# Patient Record
Sex: MALE | Race: WHITE | Employment: FULL TIME | URBAN - METROPOLITAN AREA
[De-identification: names, ages, dates, MRNs, and addresses within clinical notes are randomized per-mention and may not be internally consistent; named-entity substitution may affect disease eponyms.]

---

## 2019-10-08 PROBLEM — M54.16 LUMBAR RADICULOPATHY: Status: ACTIVE | Noted: 2019-10-08

## 2021-12-13 PROCEDURE — 88305 TISSUE EXAM BY PATHOLOGIST: CPT

## 2021-12-14 ENCOUNTER — HOSPITAL ENCOUNTER (OUTPATIENT)
Dept: LAB | Age: 66
Discharge: HOME | End: 2021-12-14

## 2022-03-18 PROBLEM — M54.16 LUMBAR RADICULOPATHY: Status: ACTIVE | Noted: 2019-10-08

## 2024-11-22 ENCOUNTER — HOSPITAL ENCOUNTER (OUTPATIENT)
Dept: GENERAL RADIOLOGY | Age: 69
Discharge: HOME | End: 2024-11-22
Payer: MEDICARE

## 2024-11-22 ENCOUNTER — OFFICE VISIT (OUTPATIENT)
Dept: ORTHOPEDIC SURGERY | Age: 69
End: 2024-11-22

## 2024-11-22 DIAGNOSIS — M25.512 LEFT SHOULDER PAIN, UNSPECIFIED CHRONICITY: ICD-10-CM

## 2024-11-22 DIAGNOSIS — M67.912 TENDINOPATHY OF LEFT ROTATOR CUFF: ICD-10-CM

## 2024-11-22 DIAGNOSIS — M25.512 LEFT SHOULDER PAIN, UNSPECIFIED CHRONICITY: Primary | ICD-10-CM

## 2024-11-22 PROCEDURE — 73030 X-RAY EXAM OF SHOULDER: CPT

## 2024-11-22 RX ORDER — METHYLPREDNISOLONE ACETATE 40 MG/ML
40 INJECTION, SUSPENSION INTRA-ARTICULAR; INTRALESIONAL; INTRAMUSCULAR; SOFT TISSUE ONCE
Status: COMPLETED | OUTPATIENT
Start: 2024-11-22 | End: 2024-11-22

## 2024-11-22 RX ADMIN — METHYLPREDNISOLONE ACETATE 40 MG: 40 INJECTION, SUSPENSION INTRA-ARTICULAR; INTRALESIONAL; INTRAMUSCULAR; SOFT TISSUE at 11:05

## 2024-11-22 NOTE — PROGRESS NOTES
Name: Dre Royal  YOB: 1955  Gender: male  MRN: [id number]  Date of Encounter:  11/22/2024       CHIEF COMPLAINT:     Chief Complaint   Patient presents with    Shoulder Pain     Left        SUBJECTIVE/OBJECTIVE:      HPI:    Dre Royal  is a 68 y.o. pleasant male with a hx of lumbar DDD who presents today for a new evaluation of his left shoulder pain.    He reports 6 months or so of atraumatic shoulder pain. He thinks it started in doing a lot of yard work. He has pain to the lateral shoulder and upper arm that is typically more notable with abduction over 90 degrees or posterior reach.  Shoulder pain is generally tolerable with sleep.  He takes Advil on occasion.  No paresthesias reported.  He is right-hand dominant.     PAST HISTORY:   Past medical, surgical, family, social history and allergies reviewed by me.   Tobacco use:  reports that he has never smoked. He has never used smokeless tobacco.  Diabetes: none  No results found for: \"LABA1C\"      REVIEW OF SYSTEMS:   As noted in HPI.     PHYSICAL EXAMINATION:     Gen: Well-developed, no acute distress   HEENT: NC/AT, EOMI   Neck: Trachea midline, normal ROM   CV: Regular rhythm by palpation of distal pulse, normal capillary refill   Pulm: No respiratory distress, no stridor   Psychiatric: Well oriented, normal mood and affect.   Skin: No rashes, lesions or ulcers, normal temperature, turgor, and texture on uninvolved extremity.      ORTHO EXAM:    Left Shoulder:     Inspection: no biceps deformity; no notable atrophy or erythema  ROM active  passive:   180. Abduction 170  170.  Ex rotation symmetric. Int rotation: lumbar spine.  Tenderness: No tenderness  Strength: Abduction 4+/5, External rotation 5/5, Internal rotation 5/5  Provocative tests: Negative Belly press, bearhug, Speeds, Cross body adduction, positive Mayorga / Neer, positive empty can   Normal capillary refill / 2+ radial pulse   Sensation intact to light 
no

## 2025-01-20 ENCOUNTER — HOSPITAL ENCOUNTER (OUTPATIENT)
Dept: PHYSICAL THERAPY | Age: 70
Setting detail: RECURRING SERIES
Discharge: HOME | End: 2025-01-23
Payer: MEDICARE

## 2025-01-20 DIAGNOSIS — M25.512 CHRONIC LEFT SHOULDER PAIN: ICD-10-CM

## 2025-01-20 DIAGNOSIS — M67.912 TENDINOPATHY OF LEFT ROTATOR CUFF: Primary | ICD-10-CM

## 2025-01-20 DIAGNOSIS — G89.29 CHRONIC LEFT SHOULDER PAIN: ICD-10-CM

## 2025-01-20 PROCEDURE — 97110 THERAPEUTIC EXERCISES: CPT

## 2025-01-20 PROCEDURE — 97161 PT EVAL LOW COMPLEX 20 MIN: CPT

## 2025-01-20 ASSESSMENT — PAIN SCALES - GENERAL: PAINLEVEL_OUTOF10: 0

## 2025-01-20 NOTE — THERAPY EVALUATION
able to help with push a cart for yard work [] [] []   4.Dre Royal will decrease Quick DASH score by 10 points to show improvement in areas of difficulty [] [] []     Goal Summary: To be determined at discharge.           Outcome Measure:   Tool Used: Disabilities of the Arm, Shoulder and Hand (DASH) Questionnaire - Quick Version  Score:  Initial: 27/55  Most Recent: X/55 (Date: -- )   Interpretation of Score: The DASH is designed to measure the activities of daily living in person's with upper extremity dysfunction or pain.  Each section is scored on a 1-5 scale, 5 representing the greatest disability.  The scores of each section are added together for a total score of 55.      Medical Necessity:   > Patient is expected to demonstrate progress in strength, range of motion, and functional technique to return to normal activity without pain.  Reason For Services/Other Comments:  > Patient continues to require modification of therapeutic interventions to increase complexity of exercises.      Regarding Dre Royal's therapy, I certify that the treatment plan above will be carried out by a therapist or under their direction.  Thank you for this referral,  Shiva Garcia PT     Referring Physician Signature: Kaelyn Venegas MD                    Charge Capture  Appt Desk

## 2025-01-20 NOTE — PROGRESS NOTES
Dre Royal  : 1955  Primary: Medicare Part A And B (Medicare)  Secondary: [employer] SENIOR MEDICARE SUPP [facility] @ [place] [address] [address] [place] [place] [address]  Phone: [phone]  Fax: [phone] Plan Frequency: 2 x a week for 6-8  Plan of Care/Certification Expiration Date: 25        Plan of Care/Certification Expiration Date:  Plan of Care/Certification Expiration Date: 25    Frequency/Duration: Plan Frequency: 2 x a week for 6-8      Time In/Out:   Time In: 1015  Time Out: 1115    PT Visit Info:    Plan Frequency: 2 x a week for 6-8      Visit Count:  1    OUTPATIENT PHYSICAL THERAPY:   Treatment Note 2025       Charge Capture   Episode  (Tendinopathy of left rotator cuff)               Treatment Diagnosis:    Tendinopathy of left rotator cuff  Chronic left shoulder pain  Medical/Referring Diagnosis:    Unspecified disorder of synovium and tendon, left shoulder [M67.912]    Referring Physician:  Kaelyn Venegas MD MD Orders:  PT Eval and Treat   Return MD Appt:  TBD   Date of Onset:  Onset Date:  (2024)     Allergies:   Patient has no allergy information on record.  Restrictions/Precautions:   None      Interventions Planned (Treatment may consist of any combination of the following):     See Assessment Note      Subjective Comments:     See Eval  Initial Pain Level::     0/10  Post Session Pain Level:       0/10  Medications Last Reviewed:  2025  Updated Objective Findings:  See Evaluation Note from today  Treatment   THERAPEUTIC EXERCISE: (23 minutes):    Exercises per grid below to improve mobility, strength, balance, coordination, and dynamic movement of generalized knee to improve functional bending, lifting, and carrying.  Required minimal visual, verbal, and manual cues to promote proper body alignment, promote proper body posture, and promote proper body mechanics.  Progressed resistance, range, repetitions, and complexity of

## 2025-01-22 ENCOUNTER — HOSPITAL ENCOUNTER (OUTPATIENT)
Dept: PHYSICAL THERAPY | Age: 70
Setting detail: RECURRING SERIES
Discharge: HOME | End: 2025-01-25
Payer: MEDICARE

## 2025-01-22 PROCEDURE — 97110 THERAPEUTIC EXERCISES: CPT

## 2025-01-22 NOTE — PROGRESS NOTES
Dre Royal  : 1955  Primary: Medicare Part A And B (Medicare)  Secondary: [employer] SENIOR MEDICARE SUPP [facility] @ [place] [address] [address] [place] [place] [address]  Phone: [phone]  Fax: [phone] Plan Frequency: 2 x a week for 6-8  Plan of Care/Certification Expiration Date: 25        Plan of Care/Certification Expiration Date:  Plan of Care/Certification Expiration Date: 25    Frequency/Duration: Plan Frequency: 2 x a week for 6-8      Time In/Out:   Time In: 0930  Time Out: 1015    PT Visit Info:    Plan Frequency: 2 x a week for 6-8      Visit Count:  2    OUTPATIENT PHYSICAL THERAPY:   Treatment Note 2025       Charge Capture   Episode  (Tendinopathy of left rotator cuff)               Treatment Diagnosis:    Tendinopathy of left rotator cuff  Chronic left shoulder pain  Medical/Referring Diagnosis:    Unspecified disorder of synovium and tendon, left shoulder [M67.912]    Referring Physician:  Kaelyn Venegas MD MD Orders:  PT Eval and Treat   Return MD Appt:  TBD   Date of Onset:  Onset Date:  (2024)     Allergies:   Patient has no allergy information on record.  Restrictions/Precautions:   None      Interventions Planned (Treatment may consist of any combination of the following):     See Assessment Note      Subjective Comments:     Patient reports soreness with wand ER  Initial Pain Level::   sore   /10  Post Session Pain Level:    sore    /10  Medications Last Reviewed:  2025  Updated Objective Findings:  None Today  Treatment   THERAPEUTIC EXERCISE: (38 minutes):    Exercises per grid below to improve mobility, strength, balance, coordination, and dynamic movement of generalized knee to improve functional bending, lifting, and carrying.  Required minimal visual, verbal, and manual cues to promote proper body alignment, promote proper body posture, and promote proper body mechanics.  Progressed resistance, range, repetitions,

## 2025-01-27 ENCOUNTER — APPOINTMENT (OUTPATIENT)
Dept: PHYSICAL THERAPY | Age: 70
End: 2025-01-27
Payer: MEDICARE

## 2025-01-29 ENCOUNTER — HOSPITAL ENCOUNTER (OUTPATIENT)
Dept: PHYSICAL THERAPY | Age: 70
Setting detail: RECURRING SERIES
Discharge: HOME | End: 2025-02-01
Payer: MEDICARE

## 2025-01-29 PROCEDURE — 97110 THERAPEUTIC EXERCISES: CPT

## 2025-01-29 NOTE — PROGRESS NOTES
Dre Royal  : 1955  Primary: Medicare Part A And B (Medicare)  Secondary: [employer] SENIOR MEDICARE SUPP [facility] @ [place] [address] [address] [place] [place] [address]  Phone: [phone]  Fax: [phone] Plan Frequency: 2 x a week for 6-8  Plan of Care/Certification Expiration Date: 25        Plan of Care/Certification Expiration Date:  Plan of Care/Certification Expiration Date: 25    Frequency/Duration: Plan Frequency: 2 x a week for 6-8      Time In/Out:   Time In: 0900  Time Out: 0945    PT Visit Info:    Plan Frequency: 2 x a week for 6-8      Visit Count:  3    OUTPATIENT PHYSICAL THERAPY:   Treatment Note 2025       Charge Capture   Episode  (Tendinopathy of left rotator cuff)               Treatment Diagnosis:    Tendinopathy of left rotator cuff  Chronic left shoulder pain  Medical/Referring Diagnosis:    Unspecified disorder of synovium and tendon, left shoulder [M67.912]    Referring Physician:  Kaelyn Venegas MD MD Orders:  PT Eval and Treat   Return MD Appt:  TBD   Date of Onset:  Onset Date:  (2024)     Allergies:   Patient has no allergy information on record.  Restrictions/Precautions:   None      Interventions Planned (Treatment may consist of any combination of the following):     See Assessment Note      Subjective Comments:     Patient reports being sick over the weekend  Initial Pain Level::   sore   /10  Post Session Pain Level:    sore    /10  Medications Last Reviewed:  2025  Updated Objective Findings:  None Today  Treatment   THERAPEUTIC EXERCISE: (40 minutes):    Exercises per grid below to improve mobility, strength, balance, coordination, and dynamic movement of generalized knee to improve functional bending, lifting, and carrying.  Required minimal visual, verbal, and manual cues to promote proper body alignment, promote proper body posture, and promote proper body mechanics.  Progressed resistance, range,

## 2025-02-03 ENCOUNTER — HOSPITAL ENCOUNTER (OUTPATIENT)
Dept: PHYSICAL THERAPY | Age: 70
Setting detail: RECURRING SERIES
Discharge: HOME | End: 2025-02-06
Payer: MEDICARE

## 2025-02-03 PROCEDURE — 97110 THERAPEUTIC EXERCISES: CPT

## 2025-02-03 NOTE — PROGRESS NOTES
Dre Royal  : 1955  Primary: Medicare Part A And B (Medicare)  Secondary: [employer] SENIOR MEDICARE SUPP [facility] @ [place] [address] [address] [place] [place] [address]  Phone: [phone]  Fax: [phone] Plan Frequency: 2 x a week for 6-8  Plan of Care/Certification Expiration Date: 25        Plan of Care/Certification Expiration Date:  Plan of Care/Certification Expiration Date: 25    Frequency/Duration: Plan Frequency: 2 x a week for 6-8      Time In/Out:   Time In: 1035  Time Out: 1120    PT Visit Info:    Plan Frequency: 2 x a week for 6-8      Visit Count:  4    OUTPATIENT PHYSICAL THERAPY:   Treatment Note 2/3/2025       Charge Capture   Episode  (Tendinopathy of left rotator cuff)               Treatment Diagnosis:    Tendinopathy of left rotator cuff  Chronic left shoulder pain  Medical/Referring Diagnosis:    Unspecified disorder of synovium and tendon, left shoulder [M67.912]    Referring Physician:  Kaelyn Venegas MD MD Orders:  PT Eval and Treat   Return MD Appt:  TBD   Date of Onset:  Onset Date:  (2024)     Allergies:   Patient has no allergy information on record.  Restrictions/Precautions:   None      Interventions Planned (Treatment may consist of any combination of the following):     See Assessment Note      Subjective Comments:     Patient reports some improvement with ROM, still having soreness with reaching and extending.  Initial Pain Level::   sore   /10  Post Session Pain Level:    sore    /10  Medications Last Reviewed:  2/3/2025  Updated Objective Findings:  None Today  Treatment   THERAPEUTIC EXERCISE: (40 minutes):    Exercises per grid below to improve mobility, strength, balance, coordination, and dynamic movement of generalized knee to improve functional bending, lifting, and carrying.  Required minimal visual, verbal, and manual cues to promote proper body alignment, promote proper body posture, and promote proper body

## 2025-02-05 ENCOUNTER — HOSPITAL ENCOUNTER (OUTPATIENT)
Dept: PHYSICAL THERAPY | Age: 70
Setting detail: RECURRING SERIES
Discharge: HOME | End: 2025-02-08
Payer: MEDICARE

## 2025-02-05 PROCEDURE — 97110 THERAPEUTIC EXERCISES: CPT

## 2025-02-05 NOTE — PROGRESS NOTES
Dre Royal  : 1955  Primary: Medicare Part A And B (Medicare)  Secondary: [employer] SENIOR MEDICARE SUPP [facility] @ [place] [address] [address] [place] [place] [address]  Phone: [phone]  Fax: [phone] Plan Frequency: 2 x a week for 6-8  Plan of Care/Certification Expiration Date: 25        Plan of Care/Certification Expiration Date:  Plan of Care/Certification Expiration Date: 25    Frequency/Duration: Plan Frequency: 2 x a week for 6-8      Time In/Out:   Time In: 1030  Time Out: 1115    PT Visit Info:    Plan Frequency: 2 x a week for 6-8      Visit Count:  5    OUTPATIENT PHYSICAL THERAPY:   Treatment Note 2025       Charge Capture   Episode  (Tendinopathy of left rotator cuff)               Treatment Diagnosis:    Tendinopathy of left rotator cuff  Chronic left shoulder pain  Medical/Referring Diagnosis:    Unspecified disorder of synovium and tendon, left shoulder [M67.912]    Referring Physician:  Kaelyn Venegas MD MD Orders:  PT Eval and Treat   Return MD Appt:  TBD   Date of Onset:  Onset Date:  (2024)     Allergies:   Patient has no allergy information on record.  Restrictions/Precautions:   None      Interventions Planned (Treatment may consist of any combination of the following):     See Assessment Note      Subjective Comments:     Patient reports some soreness yesterday more on top of shoulder  Initial Pain Level::   sore   /10  Post Session Pain Level:    sore    /10  Medications Last Reviewed:  2025  Updated Objective Findings:  None Today  Treatment   THERAPEUTIC EXERCISE: (40 minutes):    Exercises per grid below to improve mobility, strength, balance, coordination, and dynamic movement of generalized knee to improve functional bending, lifting, and carrying.  Required minimal visual, verbal, and manual cues to promote proper body alignment, promote proper body posture, and promote proper body mechanics.  Progressed

## 2025-02-10 ENCOUNTER — HOSPITAL ENCOUNTER (OUTPATIENT)
Dept: PHYSICAL THERAPY | Age: 70
Setting detail: RECURRING SERIES
Discharge: HOME | End: 2025-02-13
Payer: MEDICARE

## 2025-02-10 PROCEDURE — 97110 THERAPEUTIC EXERCISES: CPT

## 2025-02-12 ENCOUNTER — HOSPITAL ENCOUNTER (OUTPATIENT)
Dept: PHYSICAL THERAPY | Age: 70
Setting detail: RECURRING SERIES
Discharge: HOME | End: 2025-02-15
Payer: MEDICARE

## 2025-02-12 PROCEDURE — 97110 THERAPEUTIC EXERCISES: CPT

## 2025-02-12 NOTE — PROGRESS NOTES
Dre Royal  : 1955  Primary: Medicare Part A And B (Medicare)  Secondary: [employer] SENIOR MEDICARE SUPP [facility] @ [place] [address] [address] [place] [place] [address]  Phone: [phone]  Fax: [phone] Plan Frequency: 2 x a week for 6-8  Plan of Care/Certification Expiration Date: 25        Plan of Care/Certification Expiration Date:  Plan of Care/Certification Expiration Date: 25    Frequency/Duration: Plan Frequency: 2 x a week for 6-8      Time In/Out:   Time In: 1030  Time Out: 1115    PT Visit Info:    Plan Frequency: 2 x a week for 6-8      Visit Count:  7    OUTPATIENT PHYSICAL THERAPY:   Treatment Note 2025       Charge Capture   Episode  (Tendinopathy of left rotator cuff)               Treatment Diagnosis:    Tendinopathy of left rotator cuff  Chronic left shoulder pain  Medical/Referring Diagnosis:    Unspecified disorder of synovium and tendon, left shoulder [M67.912]    Referring Physician:  Kaelyn Venegas MD MD Orders:  PT Eval and Treat   Return MD Appt:  TBD   Date of Onset:  Onset Date:  (2024)     Allergies:   Patient has no allergy information on record.  Restrictions/Precautions:   None      Interventions Planned (Treatment may consist of any combination of the following):     See Assessment Note      Subjective Comments:     Patient reports doing ok, some soreness after stretching  Initial Pain Level::   sore   /10  Post Session Pain Level:    sore    /10  Medications Last Reviewed:  2025  Updated Objective Findings:  None Today  Treatment   THERAPEUTIC EXERCISE: (40 minutes):    Exercises per grid below to improve mobility, strength, balance, coordination, and dynamic movement of generalized knee to improve functional bending, lifting, and carrying.  Required minimal visual, verbal, and manual cues to promote proper body alignment, promote proper body posture, and promote proper body mechanics.  Progressed resistance,

## 2025-02-17 ENCOUNTER — APPOINTMENT (OUTPATIENT)
Dept: PHYSICAL THERAPY | Age: 70
End: 2025-02-17
Payer: MEDICARE

## 2025-02-19 ENCOUNTER — HOSPITAL ENCOUNTER (OUTPATIENT)
Dept: PHYSICAL THERAPY | Age: 70
Setting detail: RECURRING SERIES
Discharge: HOME | End: 2025-02-22
Payer: MEDICARE

## 2025-02-19 PROCEDURE — 97110 THERAPEUTIC EXERCISES: CPT

## 2025-02-19 NOTE — PROGRESS NOTES
Dre Royal  : 1955  Primary: Medicare Part A And B (Medicare)  Secondary: AETNA SENIOR MEDICARE SUPP [facility] @ [place] [address] [place] [address]  Phone: [phone]  Fax: [phone] Plan Frequency: 2 x a week for 6-8  Plan of Care/Certification Expiration Date: 25        Plan of Care/Certification Expiration Date:  Plan of Care/Certification Expiration Date: 25    Frequency/Duration: Plan Frequency: 2 x a week for 6-8      Time In/Out:   Time In: 1030  Time Out: 1115    PT Visit Info:    Plan Frequency: 2 x a week for 6-8      Visit Count:  8    OUTPATIENT PHYSICAL THERAPY:   Treatment Note 2025       Charge Capture   Episode  (Tendinopathy of left rotator cuff)               Treatment Diagnosis:    Tendinopathy of left rotator cuff  Chronic left shoulder pain  Medical/Referring Diagnosis:    Unspecified disorder of synovium and tendon, left shoulder [M67.912]    Referring Physician:  Kaelyn Venegas MD MD Orders:  PT Eval and Treat   Return MD Appt:  TBD   Date of Onset:  Onset Date:  (2024)     Allergies:   Patient has no allergy information on record.  Restrictions/Precautions:   None      Interventions Planned (Treatment may consist of any combination of the following):     See Assessment Note      Subjective Comments:     Patient reports being more sore after [place] and has been waking up when lying on left side  Initial Pain Level::   sore   /10  Post Session Pain Level:    sore    /10  Medications Last Reviewed:  2025  Updated Objective Findings:  None Today  Treatment   THERAPEUTIC EXERCISE: (40 minutes):    Exercises per grid below to improve mobility, strength, balance, coordination, and dynamic movement of generalized knee to improve functional bending, lifting, and carrying.  Required minimal visual, verbal, and manual cues to promote proper body alignment, promote proper

## 2025-02-24 ENCOUNTER — HOSPITAL ENCOUNTER (OUTPATIENT)
Dept: PHYSICAL THERAPY | Age: 70
Setting detail: RECURRING SERIES
Discharge: HOME | End: 2025-02-27
Payer: MEDICARE

## 2025-02-24 PROCEDURE — 97110 THERAPEUTIC EXERCISES: CPT

## 2025-02-24 NOTE — PROGRESS NOTES
rDe Royal  : 1955  Primary: Medicare Part A And B (Medicare)  Secondary: A[employer] SENIOR MEDICARE SUPP [facility] @ [place] [address] [address] [place] [address]  Phone: [phone]  Fax: [phone] Plan Frequency: 2 x a week for 6-8  Plan of Care/Certification Expiration Date: 25        Plan of Care/Certification Expiration Date:  Plan of Care/Certification Expiration Date: 25    Frequency/Duration: Plan Frequency: 2 x a week for 6-8      Time In/Out:   Time In: 1030  Time Out: 1115    PT Visit Info:    Plan Frequency: 2 x a week for 6-8      Visit Count:  9    OUTPATIENT PHYSICAL THERAPY:   Treatment Note 2025       Charge Capture   Episode  (Tendinopathy of left rotator cuff)               Treatment Diagnosis:    Tendinopathy of left rotator cuff  Chronic left shoulder pain  Medical/Referring Diagnosis:    Unspecified disorder of synovium and tendon, left shoulder [M67.912]    Referring Physician:  Kaelyn Venegas MD MD Orders:  PT Eval and Treat   Return MD Appt:  TBD   Date of Onset:  Onset Date:  (2024)     Allergies:   Patient has no allergy information on record.  Restrictions/Precautions:   None      Interventions Planned (Treatment may consist of any combination of the following):     See Assessment Note      Subjective Comments:     Patient reports he was doing well and tripped over cat in the house and hit his shoulder on the door frame.  Initial Pain Level::   sore   /10  Post Session Pain Level:    sore    /10  Medications Last Reviewed:  2025  Updated Objective Findings:  None Today  Treatment   THERAPEUTIC EXERCISE: (40 minutes):    Exercises per grid below to improve mobility, strength, balance, coordination, and dynamic movement of generalized knee to improve functional bending, lifting, and carrying.  Required minimal visual, verbal, and manual cues to promote proper body alignment, promote proper body posture, and

## 2025-02-26 ENCOUNTER — HOSPITAL ENCOUNTER (OUTPATIENT)
Dept: PHYSICAL THERAPY | Age: 70
Setting detail: RECURRING SERIES
Discharge: HOME | End: 2025-03-01
Payer: MEDICARE

## 2025-02-26 PROCEDURE — 97110 THERAPEUTIC EXERCISES: CPT

## 2025-02-26 NOTE — PROGRESS NOTES
Dre Royal  : 1955  Primary: Medicare Part A And B (Medicare)  Secondary: AETNA SENIOR MEDICARE SUPP [facility] @ [place] [address] [address] [place] [address]  Phone: [phone]  Fax: [phone] Plan Frequency: 2 x a week for 6-8  Plan of Care/Certification Expiration Date: 25        Plan of Care/Certification Expiration Date:  Plan of Care/Certification Expiration Date: 25    Frequency/Duration: Plan Frequency: 2 x a week for 6-8      Time In/Out:   Time In: 1030  Time Out: 1115    PT Visit Info:    Plan Frequency: 2 x a week for 6-8      Visit Count:  10    OUTPATIENT PHYSICAL THERAPY:   Treatment Note 2025       Charge Capture   Episode  (Tendinopathy of left rotator cuff)               Treatment Diagnosis:    Tendinopathy of left rotator cuff  Chronic left shoulder pain  Medical/Referring Diagnosis:    Unspecified disorder of synovium and tendon, left shoulder [M67.912]    Referring Physician:  Kaelyn Venegas MD MD Orders:  PT Eval and Treat   Return MD Appt:  TBD   Date of Onset:  Onset Date:  (2024)     Allergies:   Patient has no allergy information on record.  Restrictions/Precautions:   None      Interventions Planned (Treatment may consist of any combination of the following):     See Assessment Note      Subjective Comments:     Patient reports feeling really good after last visit then tweaked his shoulder reaching into the cabinet.  Initial Pain Level::   sore   /10  Post Session Pain Level:    sore    /10  Medications Last Reviewed:  2025  Updated Objective Findings:  None Today  Treatment   THERAPEUTIC EXERCISE: (40 minutes):    Exercises per grid below to improve mobility, strength, balance, coordination, and dynamic movement of generalized knee to improve functional bending, lifting, and carrying.  Required minimal visual, verbal, and manual cues to promote proper body alignment, promote proper body posture, and

## 2025-03-04 ENCOUNTER — HOSPITAL ENCOUNTER (OUTPATIENT)
Dept: PHYSICAL THERAPY | Age: 70
Setting detail: RECURRING SERIES
Discharge: HOME | End: 2025-03-07
Payer: MEDICARE

## 2025-03-04 PROCEDURE — 97110 THERAPEUTIC EXERCISES: CPT

## 2025-03-04 NOTE — PROGRESS NOTES
resistance, range, repetitions, and complexity of movement as indicated.   Date:  2/12/2025 Date:  2/19/2025 Date:  2/24/2025 Date:  2/26/2025 Date:  3/4/2025   Activity/Exercise        Shoulder ROM  5 min 5 min 5min    UBE 3/3 L5 4/4 L5 4/4 L5 3/3 L5 3/3 L5   Rings L 98x42lod L 80v08hkj L 08m43xbj L 63s14pjf L 38f93flh   UT LS Stretch        PCS 2a12zcm 5d77kcy 5q19bim 7g62tgb L 79g76nkn           Doorway stretch L 3z84zvn L 8i77caj L 5f54vgp L 5b24ran    Wand Flexion ER        Scapular retraction        Biceps stretch 2w54sbs 3r85qbp 9q08lvf 5d16rqr 9o22olv   FE        Hang stretch    5d64rte 9k50zwj 1n62nym   Bilateral ER     TRX 3x10           Row TRX 3x10 DB L/R 2x10 20# TRX 3x10 TRX 3x10 high pull TRX 3x10 high pull   Low Row        Pull down        ER        Bicep curl     TRX 3x10   FE w/ ER        Punch with ER        TRX Fallout  2x10 2x10 2x10 2x10   Windmill TRX blue 2x10  TRX blue 2x10  TRX 2x10 Blk   90/90 ER TRX blue 2x10  TRX blue 2x10     Farmer carry 2x2 laps 2/25#       Bent over arm raise  4x5l6yeo ea 3#      WB        Press 30\" Box 2x10  TRX 3x10      Overhead press   TRX blue wand 2x10 TRX blue wand 2x10 15# bar 3x8   DB raise 3 way 2x10 5# 2 way 2x10 5#      Overhead carry 3 laps 15# plate   4 laps 15# plate    Swimmer     Prone 2x10       MANUAL THERAPY: (0 minutes):   Joint mobilization and Soft tissue mobilization was utilized and necessary because of the patient's restricted joint motion, painful spasm, loss of articular motion, and restricted motion of soft tissue.      Date  3/4/2025    Technique Used Grade  Level # Time(s) Effect while being performed                                                      MODALITIES:          Treatment/Session Summary:    Treatment Assessment:   Patient was progressed with OH strengthening without increased pain    Communication/Consultation:  None today  Equipment provided today:  HEP  Recommendations/Intent for next treatment session: Next visit will

## 2025-03-06 ENCOUNTER — HOSPITAL ENCOUNTER (OUTPATIENT)
Dept: PHYSICAL THERAPY | Age: 70
Setting detail: RECURRING SERIES
Discharge: HOME | End: 2025-03-09
Payer: MEDICARE

## 2025-03-06 PROCEDURE — 97110 THERAPEUTIC EXERCISES: CPT

## 2025-03-06 NOTE — PROGRESS NOTES
Dre Royal  : 1955  Primary: Medicare Part A And B (Medicare)  Secondary: [employer] SENIOR MEDICARE SUPP [facility] @ [place] [address] [place] [place] [address]  Phone: [phone]  Fax: [phone] Plan Frequency: 2 x a week for 6-8  Plan of Care/Certification Expiration Date: 25        Plan of Care/Certification Expiration Date:  Plan of Care/Certification Expiration Date: 25    Frequency/Duration: Plan Frequency: 2 x a week for 6-8      Time In/Out:   Time In: 1030  Time Out: 1115    PT Visit Info:    Plan Frequency: 2 x a week for 6-8      Visit Count:  12    OUTPATIENT PHYSICAL THERAPY:   Treatment Note 3/6/2025       Charge Capture   Episode  (Tendinopathy of left rotator cuff)               Treatment Diagnosis:    Tendinopathy of left rotator cuff  Chronic left shoulder pain  Medical/Referring Diagnosis:    Unspecified disorder of synovium and tendon, left shoulder [M67.912]    Referring Physician:  Kaelyn Venegas MD MD Orders:  PT Eval and Treat   Return MD Appt:  TBD   Date of Onset:  Onset Date:  (2024)     Allergies:   Patient has no allergy information on record.  Restrictions/Precautions:   None      Interventions Planned (Treatment may consist of any combination of the following):     See Assessment Note      Subjective Comments:     Patient reports if he didn't sleep on left shoulder he would feel more improvement  Initial Pain Level::   sore   /10  Post Session Pain Level:    sore    /10  Medications Last Reviewed:  3/6/2025  Updated Objective Findings:  None Today  Treatment   THERAPEUTIC EXERCISE: (40 minutes):    Exercises per grid below to improve mobility, strength, balance, coordination, and dynamic movement of generalized knee to improve functional bending, lifting, and carrying.  Required minimal visual, verbal, and manual cues to promote proper body alignment, promote proper body posture, and promote proper body mechanics.

## 2025-03-11 ENCOUNTER — APPOINTMENT (OUTPATIENT)
Dept: PHYSICAL THERAPY | Age: 70
End: 2025-03-11
Payer: MEDICARE

## 2025-03-18 ENCOUNTER — HOSPITAL ENCOUNTER (OUTPATIENT)
Dept: PHYSICAL THERAPY | Age: 70
Setting detail: RECURRING SERIES
Discharge: HOME | End: 2025-03-21
Payer: MEDICARE

## 2025-03-18 PROCEDURE — 97110 THERAPEUTIC EXERCISES: CPT

## 2025-03-18 NOTE — PROGRESS NOTES
[facility]
provided today:  HEP  Recommendations/Intent for next treatment session: Next visit will focus on progression of ROM and strength.    >Total Treatment Billable Duration:  40 minutes   Time In: 0945  Time Out: 1030    Shiva Garcia PT         Charge Capture  [employer] [facility]  Appt Desk     Future Appointments   Date Time Provider Department Center   3/25/2025 10:30 AM Shiva Garcia, PT [facility]   3/27/2025  1:00 PM Shiva Garcia, PT [facility]   4/2/2025 10:15 AM Shiva Garcia, PT [facility]   4/4/2025  8:15 AM Shiva Garcia, PT [facility]   4/8/2025  9:45 AM Shiva Garcia, PT [facility]   4/10/2025  9:45 AM Shiva aGrcia, PT [facility]

## 2025-03-20 ENCOUNTER — APPOINTMENT (OUTPATIENT)
Dept: PHYSICAL THERAPY | Age: 70
End: 2025-03-20
Payer: MEDICARE

## 2025-03-25 ENCOUNTER — HOSPITAL ENCOUNTER (OUTPATIENT)
Dept: PHYSICAL THERAPY | Age: 70
Setting detail: RECURRING SERIES
Discharge: HOME | End: 2025-03-28
Payer: MEDICARE

## 2025-03-25 PROCEDURE — 97110 THERAPEUTIC EXERCISES: CPT

## 2025-03-25 NOTE — PROGRESS NOTES
Dre Royal  : 1955  Primary: Medicare Part A And B (Medicare)  Secondary: [employer] SENIOR MEDICARE SUPP [facility] @ [place] [address] [address] [place] [place] [address]  Phone: [phone]  Fax: [phone] Plan Frequency: 2 x a week for 6-8  Plan of Care/Certification Expiration Date: 25        Plan of Care/Certification Expiration Date:  Plan of Care/Certification Expiration Date: 25    Frequency/Duration: Plan Frequency: 2 x a week for 6-8      Time In/Out:   Time In: 1030  Time Out: 1115    PT Visit Info:    Plan Frequency: 2 x a week for 6-8      Visit Count:  14    OUTPATIENT PHYSICAL THERAPY:   Treatment Note 3/25/2025       Charge Capture   Episode  (Tendinopathy of left rotator cuff)               Treatment Diagnosis:    Tendinopathy of left rotator cuff  Chronic left shoulder pain  Medical/Referring Diagnosis:    Unspecified disorder of synovium and tendon, left shoulder [M67.912]    Referring Physician:  Kaelyn Venegas MD MD Orders:  PT Eval and Treat   Return MD Appt:  TBD   Date of Onset:  Onset Date:  (2024)     Allergies:   Patient has no allergy information on record.  Restrictions/Precautions:   None      Interventions Planned (Treatment may consist of any combination of the following):     See Assessment Note      Subjective Comments:     Patient reports being able to rake the yard without increased shoulder pain  Initial Pain Level::   sore   /10  Post Session Pain Level:    sore    /10  Medications Last Reviewed:  3/25/2025  Updated Objective Findings:  None Today  Treatment   THERAPEUTIC EXERCISE: (40 minutes):    Exercises per grid below to improve mobility, strength, balance, coordination, and dynamic movement of generalized knee to improve functional bending, lifting, and carrying.  Required minimal visual, verbal, and manual cues to promote proper body alignment, promote proper body posture, and promote proper body mechanics.

## 2025-03-27 ENCOUNTER — HOSPITAL ENCOUNTER (OUTPATIENT)
Dept: PHYSICAL THERAPY | Age: 70
Setting detail: RECURRING SERIES
Discharge: HOME | End: 2025-03-30
Payer: MEDICARE

## 2025-03-27 PROCEDURE — 97110 THERAPEUTIC EXERCISES: CPT

## 2025-03-27 NOTE — PROGRESS NOTES
Dre Royal  : 1955  Primary: Medicare Part A And B (Medicare)  Secondary: [employer] SENIOR MEDICARE SUPP [facility] @ [place] [address] [address] [place] [place] [address]  Phone: [phone]  Fax: [phone] Plan Frequency: 2 x a week for 6-8  Plan of Care/Certification Expiration Date: 25        Plan of Care/Certification Expiration Date:  Plan of Care/Certification Expiration Date: 25    Frequency/Duration: Plan Frequency: 2 x a week for 6-8      Time In/Out:   Time In: 1300  Time Out: 1345    PT Visit Info:    Plan Frequency: 2 x a week for 6-8      Visit Count:  15    OUTPATIENT PHYSICAL THERAPY:   Treatment Note 3/27/2025       Charge Capture   Episode  (Tendinopathy of left rotator cuff)               Treatment Diagnosis:    Tendinopathy of left rotator cuff  Chronic left shoulder pain  Medical/Referring Diagnosis:    Unspecified disorder of synovium and tendon, left shoulder [M67.912]    Referring Physician:  Kaelyn Venegas MD MD Orders:  PT Eval and Treat   Return MD Appt:  TBD   Date of Onset:  Onset Date:  (2024)     Allergies:   Patient has no allergy information on record.  Restrictions/Precautions:   None      Interventions Planned (Treatment may consist of any combination of the following):     See Assessment Note      Subjective Comments:     Patient reports feeling better with reaching  Initial Pain Level::   sore   /10  Post Session Pain Level:    sore    /10  Medications Last Reviewed:  3/27/2025  Updated Objective Findings:  None Today  Treatment   THERAPEUTIC EXERCISE: (40 minutes):    Exercises per grid below to improve mobility, strength, balance, coordination, and dynamic movement of generalized knee to improve functional bending, lifting, and carrying.  Required minimal visual, verbal, and manual cues to promote proper body alignment, promote proper body posture, and promote proper body mechanics.  Progressed resistance, range,

## 2025-04-02 ENCOUNTER — HOSPITAL ENCOUNTER (OUTPATIENT)
Dept: PHYSICAL THERAPY | Age: 70
Setting detail: RECURRING SERIES
Discharge: HOME | End: 2025-04-05
Payer: MEDICARE

## 2025-04-02 PROCEDURE — 97110 THERAPEUTIC EXERCISES: CPT

## 2025-04-02 NOTE — PROGRESS NOTES
Dre Royal  : 1955  Primary: Medicare Part A And B (Medicare)  Secondary: [employer] SENIOR MEDICARE SUPP [facility] @ [place] [address] [address] [place] [place] [address]  Phone: [phone]  Fax: [phone] Plan Frequency: 2 x a week for 6-8  Plan of Care/Certification Expiration Date: 25        Plan of Care/Certification Expiration Date:  Plan of Care/Certification Expiration Date: 25    Frequency/Duration: Plan Frequency: 2 x a week for 6-8      Time In/Out:   Time In: 1015  Time Out: 1100    PT Visit Info:    Plan Frequency: 2 x a week for 6-8      Visit Count:  16    OUTPATIENT PHYSICAL THERAPY:   Treatment Note 2025       Charge Capture   Episode  (Tendinopathy of left rotator cuff)               Treatment Diagnosis:    Tendinopathy of left rotator cuff  Chronic left shoulder pain  Medical/Referring Diagnosis:    Unspecified disorder of synovium and tendon, left shoulder [M67.912]    Referring Physician:  Kaelyn Venegas MD MD Orders:  PT Eval and Treat   Return MD Appt:  TBD   Date of Onset:  Onset Date:  (2024)     Allergies:   Patient has no allergy information on record.  Restrictions/Precautions:   None      Interventions Planned (Treatment may consist of any combination of the following):     See Assessment Note      Subjective Comments:     Patient reports injuring his left ribs pulling weeds  Initial Pain Level::   sore   /10  Post Session Pain Level:    sore    /10  Medications Last Reviewed:  2025  Updated Objective Findings:  None Today  Treatment   THERAPEUTIC EXERCISE: (40 minutes):    Exercises per grid below to improve mobility, strength, balance, coordination, and dynamic movement of generalized knee to improve functional bending, lifting, and carrying.  Required minimal visual, verbal, and manual cues to promote proper body alignment, promote proper body posture, and promote proper body mechanics.  Progressed resistance, range,

## 2025-04-04 ENCOUNTER — HOSPITAL ENCOUNTER (OUTPATIENT)
Dept: PHYSICAL THERAPY | Age: 70
Setting detail: RECURRING SERIES
Discharge: HOME | End: 2025-04-07
Payer: MEDICARE

## 2025-04-04 PROCEDURE — 97110 THERAPEUTIC EXERCISES: CPT

## 2025-04-04 NOTE — PROGRESS NOTES
Dre Royal  : 1955  Primary: Medicare Part A And B (Medicare)  Secondary: [employer] SENIOR MEDICARE SUPP [facility] @ [place] [address] [address] [place] [place] [address]  Phone: [phone]  Fax: [phone] Plan Frequency: 2 x a week for 6-8  Plan of Care/Certification Expiration Date: 25        Plan of Care/Certification Expiration Date:  Plan of Care/Certification Expiration Date: 25    Frequency/Duration: Plan Frequency: 2 x a week for 6-8      Time In/Out:   Time In: 0815  Time Out: 0900    PT Visit Info:    Plan Frequency: 2 x a week for 6-8      Visit Count:  17    OUTPATIENT PHYSICAL THERAPY:   Treatment Note 2025       Charge Capture   Episode  (Tendinopathy of left rotator cuff)               Treatment Diagnosis:    Tendinopathy of left rotator cuff  Chronic left shoulder pain  Medical/Referring Diagnosis:    Unspecified disorder of synovium and tendon, left shoulder [M67.912]    Referring Physician:  Kaelyn Venegas MD MD Orders:  PT Eval and Treat   Return MD Appt:  TBD   Date of Onset:  Onset Date:  (2024)     Allergies:   Patient has no allergy information on record.  Restrictions/Precautions:   None      Interventions Planned (Treatment may consist of any combination of the following):     See Assessment Note      Subjective Comments:     Patient reports that ribs are still sore  Initial Pain Level::   sore   /10  Post Session Pain Level:    sore    /10  Medications Last Reviewed:  2025  Updated Objective Findings:  None Today  Treatment   THERAPEUTIC EXERCISE: (40 minutes):    Exercises per grid below to improve mobility, strength, balance, coordination, and dynamic movement of generalized knee to improve functional bending, lifting, and carrying.  Required minimal visual, verbal, and manual cues to promote proper body alignment, promote proper body posture, and promote proper body mechanics.  Progressed resistance, range,

## 2025-04-08 ENCOUNTER — HOSPITAL ENCOUNTER (OUTPATIENT)
Dept: PHYSICAL THERAPY | Age: 70
Setting detail: RECURRING SERIES
Discharge: HOME | End: 2025-04-11
Payer: MEDICARE

## 2025-04-08 PROCEDURE — 97110 THERAPEUTIC EXERCISES: CPT

## 2025-04-08 NOTE — PROGRESS NOTES
Dre Royal  : 1955  Primary: Medicare Part A And B (Medicare)  Secondary: [employer] SENIOR MEDICARE SUPP [facility] @ [place] [address] [address] [place] [place] [address]  Phone: [phone]  Fax: [phone] Plan Frequency: 2 x a week for 6-8  Plan of Care/Certification Expiration Date: 25        Plan of Care/Certification Expiration Date:  Plan of Care/Certification Expiration Date: 25    Frequency/Duration: Plan Frequency: 2 x a week for 6-8      Time In/Out:   Time In: 0945  Time Out: 1030    PT Visit Info:    Plan Frequency: 2 x a week for 6-8      Visit Count:  18    OUTPATIENT PHYSICAL THERAPY:   Treatment Note 2025       Charge Capture   Episode  (Tendinopathy of left rotator cuff)               Treatment Diagnosis:    Tendinopathy of left rotator cuff  Chronic left shoulder pain  Medical/Referring Diagnosis:    Unspecified disorder of synovium and tendon, left shoulder [M67.912]    Referring Physician:  Kaelyn Venegas MD MD Orders:  PT Eval and Treat   Return MD Appt:  TBD   Date of Onset:  Onset Date:  (2024)     Allergies:   Patient has no allergy information on record.  Restrictions/Precautions:   None      Interventions Planned (Treatment may consist of any combination of the following):     See Assessment Note      Subjective Comments:     Patient reports still limited by rib soreness  Initial Pain Level::   sore   /10  Post Session Pain Level:    sore    /10  Medications Last Reviewed:  2025  Updated Objective Findings:  None Today  Treatment   THERAPEUTIC EXERCISE: (40 minutes):    Exercises per grid below to improve mobility, strength, balance, coordination, and dynamic movement of generalized knee to improve functional bending, lifting, and carrying.  Required minimal visual, verbal, and manual cues to promote proper body alignment, promote proper body posture, and promote proper body mechanics.  Progressed resistance, range,

## 2025-04-22 ENCOUNTER — HOSPITAL ENCOUNTER (OUTPATIENT)
Dept: PHYSICAL THERAPY | Age: 70
Setting detail: RECURRING SERIES
Discharge: HOME | End: 2025-04-25
Payer: MEDICARE

## 2025-04-22 PROCEDURE — 97110 THERAPEUTIC EXERCISES: CPT

## 2025-04-22 NOTE — PROGRESS NOTES
Dre Royal  : 1955  Primary: Medicare Part A And B (Medicare)  Secondary: AETNA SENIOR MEDICARE SUPP [facility] @ [place] [address] [address] [place] [address]  Phone: [phone]  Fax: [phone] Plan Frequency: 2 x a week for 6-8  Plan of Care/Certification Expiration Date: 25        Plan of Care/Certification Expiration Date:  Plan of Care/Certification Expiration Date: 25    Frequency/Duration: Plan Frequency: 2 x a week for 6-8      Time In/Out:   Time In: 0805  Time Out: 0850    PT Visit Info:    Plan Frequency: 2 x a week for 6-8      Visit Count:  19    OUTPATIENT PHYSICAL THERAPY:   Treatment Note 2025       Charge Capture   Episode  (Tendinopathy of left rotator cuff)               Treatment Diagnosis:    Tendinopathy of left rotator cuff  Chronic left shoulder pain  Medical/Referring Diagnosis:    Unspecified disorder of synovium and tendon, left shoulder [M67.912]    Referring Physician:  Kaelyn Venegas MD MD Orders:  PT Eval and Treat   Return MD Appt:  TBD   Date of Onset:  Onset Date:  (2024)     Allergies:   Patient has no allergy information on record.  Restrictions/Precautions:   None      Interventions Planned (Treatment may consist of any combination of the following):     See Assessment Note      Subjective Comments:     Patient reports noticing improvement with reaching left shoulder  Initial Pain Level::   sore   /10  Post Session Pain Level:    sore    /10  Medications Last Reviewed:  2025  Updated Objective Findings:  None Today  Treatment   THERAPEUTIC EXERCISE: (40 minutes):    Exercises per grid below to improve mobility, strength, balance, coordination, and dynamic movement of generalized knee to improve functional bending, lifting, and carrying.  Required minimal visual, verbal, and manual cues to promote proper body alignment, promote proper body posture, and promote proper body mechanics.  Progressed

## 2025-04-22 NOTE — THERAPY DISCHARGE
Dre Royal  : 1955  Primary: Medicare Part A And B (Medicare)  Secondary: [employer]ET[employer] SENIOR MEDICARE SUPP [facility] @ [place] [address] [address] [place] [address]  Phone: [phone]  Fax: [phone] Plan Frequency: 2 x a week for 6-8    Plan of Care/Certification Expiration Date: 25        Plan of Care/Certification Expiration Date:  Plan of Care/Certification Expiration Date: 25    Frequency/Duration: Plan Frequency: 2 x a week for 6-8      Time In/Out:   Time In: 0805  Time Out: 0850    PT Visit Info:    Plan Frequency: 2 x a week for 6-8      Visit Count:  19                OUTPATIENT PHYSICAL THERAPY:             Progress Report and Discharge Summary 2025                 Episode (Tendinopathy of left rotator cuff)         Treatment Diagnosis:     Tendinopathy of left rotator cuff  Chronic left shoulder pain  Medical/Referring Diagnosis:    Unspecified disorder of synovium and tendon, left shoulder [M67.912]    Referring Physician:  Kaelyn Venegas MD MD Orders:  PT Eval and Treat   Return MD Appt:  TBD  Date of Onset:  Onset Date:  (2024)     Allergies:  Patient has no allergy information on record.  Restrictions/Precautions:    None      Medications Last Reviewed:  2025     SUBJECTIVE   History of Injury/Illness (Reason for Referral):  Patient reports the beginning of left shoulder pain in 2024 after doing yard work. He states injection one month ago helped for about two weeks. His main limitation reaching overhead and back.  Patient Stated Goal(s):  \"Pain Free\"  Initial Pain Level:      /10   Post Session Pain Level:      /10  Past Medical History/Comorbidities:   Mr. Royal  has no past medical history on file.  Mr. Royal  has a past surgical history that includes hernia repair ( & ).  Social History/Living Environment:   Patient lives with their spouse  Type of Home: House: Two Story    Level of